# Patient Record
Sex: FEMALE | Race: WHITE | NOT HISPANIC OR LATINO | Employment: OTHER | URBAN - METROPOLITAN AREA
[De-identification: names, ages, dates, MRNs, and addresses within clinical notes are randomized per-mention and may not be internally consistent; named-entity substitution may affect disease eponyms.]

---

## 2018-09-13 ENCOUNTER — TRANSCRIBE ORDERS (OUTPATIENT)
Dept: ADMINISTRATIVE | Facility: HOSPITAL | Age: 78
End: 2018-09-13

## 2018-09-13 DIAGNOSIS — M85.80 SAPHO SYNDROME (HCC): Primary | ICD-10-CM

## 2018-09-13 DIAGNOSIS — Z12.39 SCREENING BREAST EXAMINATION: Primary | ICD-10-CM

## 2018-09-13 DIAGNOSIS — L40.3 SAPHO SYNDROME (HCC): Primary | ICD-10-CM

## 2018-09-13 DIAGNOSIS — L70.9 SAPHO SYNDROME (HCC): Primary | ICD-10-CM

## 2018-09-13 DIAGNOSIS — M86.9 SAPHO SYNDROME (HCC): Primary | ICD-10-CM

## 2018-09-13 DIAGNOSIS — M65.9 SAPHO SYNDROME (HCC): Primary | ICD-10-CM

## 2018-09-19 ENCOUNTER — HOSPITAL ENCOUNTER (OUTPATIENT)
Dept: BONE DENSITY | Facility: HOSPITAL | Age: 78
Discharge: HOME | End: 2018-09-19
Payer: MEDICARE

## 2018-09-19 ENCOUNTER — HOSPITAL ENCOUNTER (OUTPATIENT)
Dept: MAMMOGRAPHY | Facility: HOSPITAL | Age: 78
Discharge: HOME | End: 2018-09-19
Payer: MEDICARE

## 2018-09-19 DIAGNOSIS — M65.9 SAPHO SYNDROME (HCC): ICD-10-CM

## 2018-09-19 DIAGNOSIS — M85.80 SAPHO SYNDROME (HCC): ICD-10-CM

## 2018-09-19 DIAGNOSIS — L70.9 SAPHO SYNDROME (HCC): ICD-10-CM

## 2018-09-19 DIAGNOSIS — M86.9 SAPHO SYNDROME (HCC): ICD-10-CM

## 2018-09-19 DIAGNOSIS — L40.3 SAPHO SYNDROME (HCC): ICD-10-CM

## 2018-09-19 DIAGNOSIS — Z12.39 SCREENING BREAST EXAMINATION: ICD-10-CM

## 2018-09-19 PROCEDURE — 77080 DXA BONE DENSITY AXIAL: CPT

## 2018-09-19 PROCEDURE — 77067 SCR MAMMO BI INCL CAD: CPT

## 2018-09-19 PROCEDURE — 77063 BREAST TOMOSYNTHESIS BI: CPT

## 2019-10-04 ENCOUNTER — TRANSCRIBE ORDERS (OUTPATIENT)
Dept: ADMINISTRATIVE | Facility: HOSPITAL | Age: 79
End: 2019-10-04

## 2019-10-04 DIAGNOSIS — Z12.31 ENCOUNTER FOR SCREENING MAMMOGRAM FOR MALIGNANT NEOPLASM OF BREAST: Primary | ICD-10-CM

## 2019-10-08 ENCOUNTER — HOSPITAL ENCOUNTER (OUTPATIENT)
Dept: MAMMOGRAPHY | Facility: HOSPITAL | Age: 79
Discharge: HOME | End: 2019-10-08
Payer: MEDICARE

## 2019-10-08 VITALS — BODY MASS INDEX: 31.36 KG/M2 | WEIGHT: 177 LBS | HEIGHT: 63 IN

## 2019-10-08 DIAGNOSIS — Z12.31 ENCOUNTER FOR SCREENING MAMMOGRAM FOR MALIGNANT NEOPLASM OF BREAST: ICD-10-CM

## 2019-10-08 PROCEDURE — 77063 BREAST TOMOSYNTHESIS BI: CPT

## 2019-10-08 PROCEDURE — 77067 SCR MAMMO BI INCL CAD: CPT

## 2020-07-09 ENCOUNTER — OFFICE VISIT (OUTPATIENT)
Dept: UROLOGY | Facility: MEDICAL CENTER | Age: 80
End: 2020-07-09
Payer: MEDICARE

## 2020-07-09 VITALS
WEIGHT: 175 LBS | HEART RATE: 77 BPM | SYSTOLIC BLOOD PRESSURE: 120 MMHG | DIASTOLIC BLOOD PRESSURE: 70 MMHG | TEMPERATURE: 98.4 F | BODY MASS INDEX: 31.01 KG/M2 | HEIGHT: 63 IN

## 2020-07-09 DIAGNOSIS — N39.0 FREQUENT UTI: Primary | ICD-10-CM

## 2020-07-09 DIAGNOSIS — Z71.89 OTHER SPECIFIED COUNSELING: ICD-10-CM

## 2020-07-09 LAB
SL AMB  POCT GLUCOSE, UA: NORMAL
SL AMB LEUKOCYTE ESTERASE,UA: NORMAL
SL AMB POCT BILIRUBIN,UA: NORMAL
SL AMB POCT BLOOD,UA: NORMAL
SL AMB POCT CLARITY,UA: CLEAR
SL AMB POCT COLOR,UA: YELLOW
SL AMB POCT KETONES,UA: NORMAL
SL AMB POCT NITRITE,UA: NORMAL
SL AMB POCT PH,UA: 6
SL AMB POCT SPECIFIC GRAVITY,UA: 1.02
SL AMB POCT URINE PROTEIN: NORMAL
SL AMB POCT UROBILINOGEN: 0.2

## 2020-07-09 PROCEDURE — 81003 URINALYSIS AUTO W/O SCOPE: CPT | Performed by: UROLOGY

## 2020-07-09 PROCEDURE — 99203 OFFICE O/P NEW LOW 30 MIN: CPT | Performed by: UROLOGY

## 2020-07-09 RX ORDER — METOPROLOL SUCCINATE 25 MG/1
TABLET, EXTENDED RELEASE ORAL
COMMUNITY
Start: 2020-05-26

## 2020-07-09 RX ORDER — ASPIRIN 81 MG/1
81 TABLET ORAL DAILY
COMMUNITY

## 2020-07-09 RX ORDER — ESOMEPRAZOLE MAGNESIUM 40 MG/1
CAPSULE, DELAYED RELEASE ORAL
COMMUNITY
Start: 2020-06-22

## 2020-07-09 RX ORDER — ALLOPURINOL 100 MG/1
TABLET ORAL
COMMUNITY
Start: 2020-06-12

## 2020-07-09 RX ORDER — ATORVASTATIN CALCIUM 20 MG/1
TABLET, FILM COATED ORAL
COMMUNITY
Start: 2020-06-22

## 2020-07-09 NOTE — PROGRESS NOTES
Assessment/Plan:    Frequent UTI  The patient's recent CT scan cleared were upper tracts  The patient will be scheduled for cystoscopy  Diagnoses and all orders for this visit:    Frequent UTI  -     POCT urine dip auto non-scope    Other orders  -     aspirin (ECOTRIN LOW STRENGTH) 81 mg EC tablet; Take 81 mg by mouth daily  -     Cholecalciferol 25 MCG (1000 UT) capsule; Take 1,000 Units by mouth daily  -     Calcium Carb-Cholecalciferol (CA CARB-CHOLECALCIFEROL ER PO); Take 1 tablet by mouth daily  -     allopurinol (ZYLOPRIM) 100 mg tablet  -     atorvastatin (LIPITOR) 20 mg tablet  -     esomeprazole (NexIUM) 40 MG capsule  -     metoprolol succinate (TOPROL-XL) 25 mg 24 hr tablet  -     fluticasone (VERAMYST) 27 5 MCG/SPRAY nasal spray; 2 sprays into each nostril daily  -     Lactobacillus (PROBIOTIC ACIDOPHILUS PO); Take by mouth          Subjective:      Patient ID: Rivka Kerr is a 68 y o  female  HPI  Recurrent UTI:  Pt has had 2 UTI's in past 12 months and one the prior year  Had first UTI ever about 4 years ago  Pt notes strong smell, slow stream bladder pressure and dysuria  No blood, fever or back pain  No pneumaturia  Infections do not have any identified triggers  Accompanied by dtr     The following portions of the patient's history were reviewed and updated as appropriate: allergies, current medications, past family history, past medical history, past social history, past surgical history and problem list     Review of Systems   Constitutional: Negative for activity change and fatigue  Gout on allopurinol  Respiratory: Negative for shortness of breath and wheezing  Cardiovascular: Negative for chest pain  Taking metoprolol for tachycardia  Gastrointestinal: Negative for abdominal pain  Genitourinary: Negative for difficulty urinating, dysuria, frequency, hematuria and urgency  Musculoskeletal: Negative for back pain and gait problem     Skin: Negative  Allergic/Immunologic: Negative  Neurological: Negative  Psychiatric/Behavioral: Negative  Objective:      /70   Pulse 77   Temp 98 4 °F (36 9 °C)   Ht 5' 3" (1 6 m)   Wt 79 4 kg (175 lb)   BMI 31 00 kg/m²          Physical Exam   Constitutional: She is oriented to person, place, and time  She appears well-developed and well-nourished  HENT:   Head: Normocephalic and atraumatic  Eyes: EOM are normal    Neck: Normal range of motion  Neck supple  Pulmonary/Chest: Effort normal    Abdominal: Soft  She exhibits no distension  There is no tenderness  In particular, no LLQ tenderness at site of diverticulitis identified on recent CT scan      Musculoskeletal: Normal range of motion  Neurological: She is alert and oriented to person, place, and time  She has normal reflexes  Skin: Skin is warm and dry  Psychiatric: She has a normal mood and affect   Her behavior is normal  Judgment and thought content normal

## 2020-07-09 NOTE — ASSESSMENT & PLAN NOTE
The patient's recent CT scan cleared were upper tracts  The patient will be scheduled for cystoscopy

## 2020-08-11 ENCOUNTER — TELEPHONE (OUTPATIENT)
Dept: UROLOGY | Facility: MEDICAL CENTER | Age: 80
End: 2020-08-11

## 2020-08-11 ENCOUNTER — PROCEDURE VISIT (OUTPATIENT)
Dept: UROLOGY | Facility: MEDICAL CENTER | Age: 80
End: 2020-08-11
Payer: MEDICARE

## 2020-08-11 VITALS — HEIGHT: 63 IN | WEIGHT: 175 LBS | BODY MASS INDEX: 31.01 KG/M2

## 2020-08-11 DIAGNOSIS — N39.0 FREQUENT UTI: Primary | ICD-10-CM

## 2020-08-11 PROCEDURE — 52000 CYSTOURETHROSCOPY: CPT | Performed by: UROLOGY

## 2020-08-11 PROCEDURE — 99213 OFFICE O/P EST LOW 20 MIN: CPT | Performed by: UROLOGY

## 2020-08-11 PROCEDURE — 81003 URINALYSIS AUTO W/O SCOPE: CPT | Performed by: UROLOGY

## 2020-08-11 RX ORDER — NITROFURANTOIN 25; 75 MG/1; MG/1
100 CAPSULE ORAL 2 TIMES DAILY
Qty: 14 CAPSULE | Refills: 0 | Status: SHIPPED | OUTPATIENT
Start: 2020-08-11 | End: 2020-08-14

## 2020-08-11 NOTE — PROGRESS NOTES
Assessment/Plan:    Frequent UTI  Urinary tract anatomy normal   3 day supply of Macrobid to keep on hand  Return p r n  Eamon Gonzalez Diagnoses and all orders for this visit:    Frequent UTI  -     nitrofurantoin (MACROBID) 100 mg capsule; Take 1 capsule (100 mg total) by mouth 2 (two) times a day for 3 days          Subjective:      Patient ID: Mary Jane Farley is a 68 y o  female  HPI  Recurrent urinary tract infection:  Pt has had 2 UTI's in past 12 months and one the prior year  Had first UTI ever about 4 years ago  Pt notes strong smell, slow stream bladder pressure and dysuria  No blood, fever or back pain  No pneumaturia  A CT scan in June showed mild sigmoid diverticulitis, but there is no indication that this has involved her urinary bladder      Infections do not have any identified triggers  The patient does not feel infected today  Procedures  FEMALE RIGID CYSTOSCOPY    Preoperative diagnosis:  Recurrent urinary tract infection    Postoperative diagnosis:  Normal cystoscopy      Operation:  Rigid cystoscopy    Surgeon:  Tiana Valladares MD, FACS    Anesthesia:  Local    Procedure:  Patient was brought to the cystoscopy room and positively identified  The patient was prepped and draped in sterile fashion  1% xylocaine jelly was instilled into the urethra  The cystoscope was inserted  Findings are as follows:    Urethra:normal   The bladder neck is normal  Ureteral orifices are in normal  position  The mucosa is normal    There is no trabeculation  Normal exam    The cystoscope was removed  The patient tolerated the procedure well  Following additional consultation to review the findings with the patient,s he was discharged from the office in satisfactory condition      Impression:  Normal exam            The following portions of the patient's history were reviewed and updated as appropriate: allergies, current medications, past family history, past medical history, past social history, past surgical history and problem list     Review of Systems    Constitutional: Negative for activity change and fatigue  Gout on allopurinol  Respiratory: Negative for shortness of breath and wheezing  Cardiovascular: Negative for chest pain  Taking metoprolol for tachycardia and atorvastatin for hyperlipidemia  Gastrointestinal: Negative for abdominal pain  Genitourinary: Negative for difficulty urinating, dysuria, frequency, hematuria and urgency  Musculoskeletal: Negative for back pain and gait problem  Skin: Negative  Allergic/Immunologic: Negative  Neurological: Negative  Psychiatric/Behavioral: Negative  Objective:      Ht 5' 3" (1 6 m)   Wt 79 4 kg (175 lb)   BMI 31 00 kg/m²          Physical Exam  Constitutional:       Appearance: She is well-developed  HENT:      Head: Normocephalic and atraumatic  Neck:      Musculoskeletal: Normal range of motion and neck supple  Pulmonary:      Effort: Pulmonary effort is normal    Genitourinary:     General: Normal vulva  Comments: Introitus normal   Musculoskeletal: Normal range of motion  Neurological:      Mental Status: She is alert and oriented to person, place, and time  Deep Tendon Reflexes: Reflexes are normal and symmetric  Psychiatric:         Behavior: Behavior normal          Thought Content:  Thought content normal          Judgment: Judgment normal

## 2020-08-11 NOTE — LETTER
August 11, 2020     Omayra Mehta MD  [address]    Patient: Maira Arora   YOB: 1940   Date of Visit: 8/11/2020       Dear Dr Richa Yost: Thank you for referring Donnatrice Murphy to me for evaluation  Below are my notes for this consultation  If you have questions, please do not hesitate to call me  I look forward to following your patient along with you  Sincerely,        Winston Rowell MD        CC: No Recipients  Winston Rowell MD  8/11/2020  1:41 PM  Sign when Signing Visit  Assessment/Plan:    Frequent UTI  Urinary tract anatomy normal   3 day supply of Macrobid to keep on hand  Return p r n  Narcisa Fry Diagnoses and all orders for this visit:    Frequent UTI  -     nitrofurantoin (MACROBID) 100 mg capsule; Take 1 capsule (100 mg total) by mouth 2 (two) times a day for 3 days          Subjective:      Patient ID: Maira Arora is a 68 y o  female  HPI  Recurrent urinary tract infection:  Pt has had 2 UTI's in past 12 months and one the prior year  Had first UTI ever about 4 years ago  Pt notes strong smell, slow stream bladder pressure and dysuria  No blood, fever or back pain  No pneumaturia  A CT scan in June showed mild sigmoid diverticulitis, but there is no indication that this has involved her urinary bladder      Infections do not have any identified triggers  The patient does not feel infected today  Procedures  FEMALE RIGID CYSTOSCOPY    Preoperative diagnosis:  Recurrent urinary tract infection    Postoperative diagnosis:  Normal cystoscopy      Operation:  Rigid cystoscopy    Surgeon:  Viola Goins MD, FACS    Anesthesia:  Local    Procedure:  Patient was brought to the cystoscopy room and positively identified  The patient was prepped and draped in sterile fashion  1% xylocaine jelly was instilled into the urethra  The cystoscope was inserted    Findings are as follows:    Urethra:normal   The bladder neck is normal  Ureteral orifices are in normal  position  The mucosa is normal    There is no trabeculation  Normal exam    The cystoscope was removed  The patient tolerated the procedure well  Following additional consultation to review the findings with the patient,s he was discharged from the office in satisfactory condition  Impression:  Normal exam            The following portions of the patient's history were reviewed and updated as appropriate: allergies, current medications, past family history, past medical history, past social history, past surgical history and problem list     Review of Systems    Constitutional: Negative for activity change and fatigue  Gout on allopurinol  Respiratory: Negative for shortness of breath and wheezing  Cardiovascular: Negative for chest pain  Taking metoprolol for tachycardia and atorvastatin for hyperlipidemia  Gastrointestinal: Negative for abdominal pain  Genitourinary: Negative for difficulty urinating, dysuria, frequency, hematuria and urgency  Musculoskeletal: Negative for back pain and gait problem  Skin: Negative  Allergic/Immunologic: Negative  Neurological: Negative  Psychiatric/Behavioral: Negative  Objective:      Ht 5' 3" (1 6 m)   Wt 79 4 kg (175 lb)   BMI 31 00 kg/m²          Physical Exam  Constitutional:       Appearance: She is well-developed  HENT:      Head: Normocephalic and atraumatic  Neck:      Musculoskeletal: Normal range of motion and neck supple  Pulmonary:      Effort: Pulmonary effort is normal    Genitourinary:     General: Normal vulva  Comments: Introitus normal   Musculoskeletal: Normal range of motion  Neurological:      Mental Status: She is alert and oriented to person, place, and time  Deep Tendon Reflexes: Reflexes are normal and symmetric  Psychiatric:         Behavior: Behavior normal          Thought Content:  Thought content normal          Judgment: Judgment normal

## 2020-08-11 NOTE — TELEPHONE ENCOUNTER
Patient managed by Dr Cookie Alonso  Pharmacist from [address] [place] [address] called in stating per instructions for Macrobid patient will be getting an extra 8 capsules  Should patient be given the extra capsules    Pharmacy can be reached at [phone]

## 2020-08-11 NOTE — TELEPHONE ENCOUNTER
Spoke to pharmacist at [facility] and confirmed pt is to only get six pills per Dr Santana Shadow

## 2021-02-08 ENCOUNTER — TRANSCRIBE ORDERS (OUTPATIENT)
Dept: ADMINISTRATIVE | Facility: HOSPITAL | Age: 81
End: 2021-02-08

## 2021-02-08 DIAGNOSIS — M81.0 AGE-RELATED OSTEOPOROSIS WITHOUT CURRENT PATHOLOGICAL FRACTURE: Primary | ICD-10-CM

## 2021-03-10 ENCOUNTER — HOSPITAL ENCOUNTER (OUTPATIENT)
Dept: BONE DENSITY | Facility: HOSPITAL | Age: 81
Discharge: HOME | End: 2021-03-10
Payer: MEDICARE

## 2021-03-10 DIAGNOSIS — M81.0 AGE-RELATED OSTEOPOROSIS WITHOUT CURRENT PATHOLOGICAL FRACTURE: ICD-10-CM

## 2021-03-10 PROCEDURE — 77080 DXA BONE DENSITY AXIAL: CPT

## 2023-03-29 RX ORDER — SODIUM CHLORIDE 9 MG/ML
125 INJECTION, SOLUTION INTRAVENOUS CONTINUOUS
Status: CANCELLED | OUTPATIENT
Start: 2023-03-29

## 2023-03-31 ENCOUNTER — ANESTHESIA (OUTPATIENT)
Dept: GASTROENTEROLOGY | Facility: HOSPITAL | Age: 83
End: 2023-03-31

## 2023-03-31 ENCOUNTER — HOSPITAL ENCOUNTER (OUTPATIENT)
Dept: GASTROENTEROLOGY | Facility: HOSPITAL | Age: 83
Setting detail: OUTPATIENT SURGERY
End: 2023-03-31
Attending: INTERNAL MEDICINE

## 2023-03-31 ENCOUNTER — ANESTHESIA EVENT (OUTPATIENT)
Dept: GASTROENTEROLOGY | Facility: HOSPITAL | Age: 83
End: 2023-03-31

## 2023-03-31 VITALS
TEMPERATURE: 97.4 F | BODY MASS INDEX: 30.83 KG/M2 | HEIGHT: 63 IN | SYSTOLIC BLOOD PRESSURE: 124 MMHG | RESPIRATION RATE: 16 BRPM | HEART RATE: 76 BPM | OXYGEN SATURATION: 98 % | DIASTOLIC BLOOD PRESSURE: 67 MMHG | WEIGHT: 174 LBS

## 2023-03-31 DIAGNOSIS — R13.19 ESOPHAGEAL DYSPHAGIA: ICD-10-CM

## 2023-03-31 DIAGNOSIS — K22.2 LOWER ESOPHAGEAL RING (SCHATZKI): ICD-10-CM

## 2023-03-31 DIAGNOSIS — K21.00 GASTROESOPHAGEAL REFLUX DISEASE WITH ESOPHAGITIS WITHOUT HEMORRHAGE: ICD-10-CM

## 2023-03-31 RX ORDER — SODIUM CHLORIDE 9 MG/ML
125 INJECTION, SOLUTION INTRAVENOUS CONTINUOUS
Status: DISCONTINUED | OUTPATIENT
Start: 2023-03-31 | End: 2023-04-04 | Stop reason: HOSPADM

## 2023-03-31 RX ORDER — PROPOFOL 10 MG/ML
INJECTION, EMULSION INTRAVENOUS AS NEEDED
Status: DISCONTINUED | OUTPATIENT
Start: 2023-03-31 | End: 2023-03-31

## 2023-03-31 RX ORDER — LIDOCAINE HYDROCHLORIDE 20 MG/ML
INJECTION, SOLUTION EPIDURAL; INFILTRATION; INTRACAUDAL; PERINEURAL AS NEEDED
Status: DISCONTINUED | OUTPATIENT
Start: 2023-03-31 | End: 2023-03-31

## 2023-03-31 RX ADMIN — PROPOFOL 50 MG: 10 INJECTION, EMULSION INTRAVENOUS at 07:56

## 2023-03-31 RX ADMIN — SODIUM CHLORIDE: 0.9 INJECTION, SOLUTION INTRAVENOUS at 07:48

## 2023-03-31 RX ADMIN — LIDOCAINE HYDROCHLORIDE 40 MG: 20 INJECTION, SOLUTION EPIDURAL; INFILTRATION; INTRACAUDAL; PERINEURAL at 07:53

## 2023-03-31 RX ADMIN — PROPOFOL 100 MG: 10 INJECTION, EMULSION INTRAVENOUS at 07:53

## 2023-03-31 NOTE — INTERVAL H&P NOTE
H&P reviewed  After examining the patient I find no changes in the patients condition since the H&P had been written      Vitals:    03/31/23 0707   BP: 140/67   Pulse: 70   Resp: 16   Temp: (!) 97 4 °F (36 3 °C)   SpO2: 99%

## 2023-03-31 NOTE — ANESTHESIA PREPROCEDURE EVALUATION
Procedure:  EGD    Relevant Problems   CARDIO   (+) Hypercholesterolemia      ENDO   (+) Hyperparathyroidism (HCC)      GI/HEPATIC   (+) GERD (gastroesophageal reflux disease)      /RENAL   (+) Chronic kidney disease      MUSCULOSKELETAL   (+) Gout      Musculoskeletal and Integument   (+) Osteoporosis      Genitourinary   (+) Frequent UTI        Physical Exam    Airway    Mallampati score: II  TM Distance: >3 FB  Neck ROM: full     Dental   No notable dental hx     Cardiovascular  Rhythm: regular, Rate: normal, Cardiovascular exam normal    Pulmonary  Pulmonary exam normal Breath sounds clear to auscultation,     Other Findings        Anesthesia Plan  ASA Score- 2     Anesthesia Type- IV sedation with anesthesia with ASA Monitors  Additional Monitors:   Airway Plan:     Comment: GA prn  Plan Factors-    Chart reviewed  Patient summary reviewed  Patient is not a current smoker  Patient not instructed to abstain from smoking on day of procedure  Patient did not smoke on day of surgery  Induction- intravenous  Postoperative Plan-     Informed Consent- Anesthetic plan and risks discussed with patient  I personally reviewed this patient with the CRNA  Discussed and agreed on the Anesthesia Plan with the SAMEER Sands

## 2023-03-31 NOTE — DISCHARGE INSTR - AVS FIRST PAGE
Please call [phone] with any problems  Did have some mild acid reflux and I did dilate your esophagus  Biopsies were taken to rule out some unusual things however the exam did not show anything worrisome  Please continue on your Pepcid and you should return to the office to go over things

## 2023-03-31 NOTE — ANESTHESIA POSTPROCEDURE EVALUATION
"Post-Op Assessment Note    CV Status:  Stable    Pain management: adequate     Mental Status:  Alert and awake   Hydration Status:  Euvolemic   PONV Controlled:  Controlled   Airway Patency:  Patent      Post Op Vitals Reviewed: Yes      Staff: Anesthesiologist, CRNA         No notable events documented      BP      Temp     Pulse     Resp      SpO2      /67   Pulse 76   Temp (!) 97 4 °F (36 3 °C) (Temporal)   Resp 16   Ht 5' 3\" (1 6 m)   Wt 78 9 kg (174 lb)   SpO2 98%   BMI 30 82 kg/m²     "

## 2023-04-17 PROBLEM — Z98.890 HISTORY OF ESOPHAGOGASTRODUODENOSCOPY (EGD): Status: ACTIVE | Noted: 2023-03-31
